# Patient Record
Sex: FEMALE | Race: WHITE
[De-identification: names, ages, dates, MRNs, and addresses within clinical notes are randomized per-mention and may not be internally consistent; named-entity substitution may affect disease eponyms.]

---

## 2018-12-09 ENCOUNTER — TRANSCRIPTION ENCOUNTER (OUTPATIENT)
Age: 38
End: 2018-12-09

## 2020-02-04 ENCOUNTER — OUTPATIENT (OUTPATIENT)
Dept: EMERGENCY DEPT | Facility: HOSPITAL | Age: 40
LOS: 1 days | Discharge: HOME | End: 2020-02-04
Payer: COMMERCIAL

## 2020-02-04 VITALS — DIASTOLIC BLOOD PRESSURE: 56 MMHG | HEART RATE: 108 BPM | SYSTOLIC BLOOD PRESSURE: 120 MMHG

## 2020-02-04 VITALS
DIASTOLIC BLOOD PRESSURE: 79 MMHG | OXYGEN SATURATION: 98 % | TEMPERATURE: 99 F | SYSTOLIC BLOOD PRESSURE: 133 MMHG | RESPIRATION RATE: 18 BRPM | HEART RATE: 129 BPM

## 2020-02-04 DIAGNOSIS — Z87.2 PERSONAL HISTORY OF DISEASES OF THE SKIN AND SUBCUTANEOUS TISSUE: Chronic | ICD-10-CM

## 2020-02-04 DIAGNOSIS — Z98.890 OTHER SPECIFIED POSTPROCEDURAL STATES: Chronic | ICD-10-CM

## 2020-02-04 LAB
ALBUMIN SERPL ELPH-MCNC: 3.6 G/DL — SIGNIFICANT CHANGE UP (ref 3.5–5.2)
ALP SERPL-CCNC: 103 U/L — SIGNIFICANT CHANGE UP (ref 30–115)
ALT FLD-CCNC: 18 U/L — SIGNIFICANT CHANGE UP (ref 0–41)
ANION GAP SERPL CALC-SCNC: 15 MMOL/L — HIGH (ref 7–14)
APPEARANCE UR: CLEAR — SIGNIFICANT CHANGE UP
AST SERPL-CCNC: 23 U/L — SIGNIFICANT CHANGE UP (ref 0–41)
B-OH-BUTYR SERPL-SCNC: 1.2 MMOL/L — HIGH
BACTERIA # UR AUTO: ABNORMAL
BASOPHILS # BLD AUTO: 0.05 K/UL — SIGNIFICANT CHANGE UP (ref 0–0.2)
BASOPHILS NFR BLD AUTO: 0.5 % — SIGNIFICANT CHANGE UP (ref 0–1)
BILIRUB SERPL-MCNC: 0.3 MG/DL — SIGNIFICANT CHANGE UP (ref 0.2–1.2)
BILIRUB UR-MCNC: NEGATIVE — SIGNIFICANT CHANGE UP
BUN SERPL-MCNC: 8 MG/DL — LOW (ref 10–20)
CALCIUM SERPL-MCNC: 9.2 MG/DL — SIGNIFICANT CHANGE UP (ref 8.5–10.1)
CHLORIDE SERPL-SCNC: 103 MMOL/L — SIGNIFICANT CHANGE UP (ref 98–110)
CO2 SERPL-SCNC: 18 MMOL/L — SIGNIFICANT CHANGE UP (ref 17–32)
COLOR SPEC: YELLOW — SIGNIFICANT CHANGE UP
CREAT SERPL-MCNC: 0.6 MG/DL — LOW (ref 0.7–1.5)
DIFF PNL FLD: NEGATIVE — SIGNIFICANT CHANGE UP
EOSINOPHIL # BLD AUTO: 0 K/UL — SIGNIFICANT CHANGE UP (ref 0–0.7)
EOSINOPHIL NFR BLD AUTO: 0 % — SIGNIFICANT CHANGE UP (ref 0–8)
EPI CELLS # UR: 3 /HPF — SIGNIFICANT CHANGE UP (ref 0–5)
GLUCOSE SERPL-MCNC: 83 MG/DL — SIGNIFICANT CHANGE UP (ref 70–99)
GLUCOSE UR QL: NEGATIVE — SIGNIFICANT CHANGE UP
HCG SERPL QL: POSITIVE — SIGNIFICANT CHANGE UP
HCT VFR BLD CALC: 35.7 % — LOW (ref 37–47)
HGB BLD-MCNC: 12.2 G/DL — SIGNIFICANT CHANGE UP (ref 12–16)
HYALINE CASTS # UR AUTO: 3 /LPF — SIGNIFICANT CHANGE UP (ref 0–7)
IMM GRANULOCYTES NFR BLD AUTO: 0.6 % — HIGH (ref 0.1–0.3)
KETONES UR-MCNC: ABNORMAL
LEUKOCYTE ESTERASE UR-ACNC: NEGATIVE — SIGNIFICANT CHANGE UP
LYMPHOCYTES # BLD AUTO: 0.6 K/UL — LOW (ref 1.2–3.4)
LYMPHOCYTES # BLD AUTO: 5.7 % — LOW (ref 20.5–51.1)
MAGNESIUM SERPL-MCNC: 1.8 MG/DL — SIGNIFICANT CHANGE UP (ref 1.8–2.4)
MCHC RBC-ENTMCNC: 31.6 PG — HIGH (ref 27–31)
MCHC RBC-ENTMCNC: 34.2 G/DL — SIGNIFICANT CHANGE UP (ref 32–37)
MCV RBC AUTO: 92.5 FL — SIGNIFICANT CHANGE UP (ref 81–99)
MONOCYTES # BLD AUTO: 0.32 K/UL — SIGNIFICANT CHANGE UP (ref 0.1–0.6)
MONOCYTES NFR BLD AUTO: 3 % — SIGNIFICANT CHANGE UP (ref 1.7–9.3)
NEUTROPHILS # BLD AUTO: 9.48 K/UL — HIGH (ref 1.4–6.5)
NEUTROPHILS NFR BLD AUTO: 90.2 % — HIGH (ref 42.2–75.2)
NITRITE UR-MCNC: NEGATIVE — SIGNIFICANT CHANGE UP
NRBC # BLD: 0 /100 WBCS — SIGNIFICANT CHANGE UP (ref 0–0)
PH UR: 6 — SIGNIFICANT CHANGE UP (ref 5–8)
PLATELET # BLD AUTO: 299 K/UL — SIGNIFICANT CHANGE UP (ref 130–400)
POTASSIUM SERPL-MCNC: 4.2 MMOL/L — SIGNIFICANT CHANGE UP (ref 3.5–5)
POTASSIUM SERPL-SCNC: 4.2 MMOL/L — SIGNIFICANT CHANGE UP (ref 3.5–5)
PROT SERPL-MCNC: 6.6 G/DL — SIGNIFICANT CHANGE UP (ref 6–8)
PROT UR-MCNC: ABNORMAL
RBC # BLD: 3.86 M/UL — LOW (ref 4.2–5.4)
RBC # FLD: 12.7 % — SIGNIFICANT CHANGE UP (ref 11.5–14.5)
RBC CASTS # UR COMP ASSIST: 0 /HPF — SIGNIFICANT CHANGE UP (ref 0–4)
SODIUM SERPL-SCNC: 136 MMOL/L — SIGNIFICANT CHANGE UP (ref 135–146)
SP GR SPEC: 1.03 — HIGH (ref 1.01–1.02)
UROBILINOGEN FLD QL: SIGNIFICANT CHANGE UP
WBC # BLD: 10.51 K/UL — SIGNIFICANT CHANGE UP (ref 4.8–10.8)
WBC # FLD AUTO: 10.51 K/UL — SIGNIFICANT CHANGE UP (ref 4.8–10.8)
WBC UR QL: 5 /HPF — SIGNIFICANT CHANGE UP (ref 0–5)

## 2020-02-04 PROCEDURE — 93010 ELECTROCARDIOGRAM REPORT: CPT

## 2020-02-04 PROCEDURE — 99285 EMERGENCY DEPT VISIT HI MDM: CPT

## 2020-02-04 RX ORDER — SODIUM CHLORIDE 9 MG/ML
2000 INJECTION INTRAMUSCULAR; INTRAVENOUS; SUBCUTANEOUS ONCE
Refills: 0 | Status: COMPLETED | OUTPATIENT
Start: 2020-02-04 | End: 2020-02-04

## 2020-02-04 RX ORDER — ACETAMINOPHEN 500 MG
650 TABLET ORAL ONCE
Refills: 0 | Status: COMPLETED | OUTPATIENT
Start: 2020-02-04 | End: 2020-02-04

## 2020-02-04 RX ORDER — ONDANSETRON 8 MG/1
4 TABLET, FILM COATED ORAL ONCE
Refills: 0 | Status: COMPLETED | OUTPATIENT
Start: 2020-02-04 | End: 2020-02-04

## 2020-02-04 RX ADMIN — Medication 650 MILLIGRAM(S): at 17:47

## 2020-02-04 RX ADMIN — SODIUM CHLORIDE 4000 MILLILITER(S): 9 INJECTION INTRAMUSCULAR; INTRAVENOUS; SUBCUTANEOUS at 17:47

## 2020-02-04 RX ADMIN — ONDANSETRON 4 MILLIGRAM(S): 8 TABLET, FILM COATED ORAL at 17:48

## 2020-02-04 NOTE — ED ADULT NURSE REASSESSMENT NOTE - NS ED NURSE REASSESS COMMENT FT1
patient assessed, patient alert and oriented x4. Patient complains of nausea, IV Zofran and Tylenol given with IV fluids. Labs sent pending results, will monitor.

## 2020-02-04 NOTE — ED PROVIDER NOTE - PHYSICAL EXAMINATION
Constitutional: Well appearing. No acute distress. Non toxic.   Eyes: PERRLA. Extraocular movements intact, no entrapment. Conjunctiva normal.   ENT: No nasal discharge. dry mucus membranes.  Neck: Supple, non tender, full range of motion.  CV: RRR no murmurs, rubs, or gallops. +S1S2.   Pulm: Clear to auscultation bilaterally. Normal work of breathing.  Abd: soft NT ND +BS.   No CVA tenderness b/l  Ext: Warm and well perfused x4, moving all extremities, no edema.   Psy: Cooperative, appropriate.   Skin: Warm, dry, no rash  Neuro: CN2-12 grossly intact no sensory or motor deficits throughout, no drift, no ataxia

## 2020-02-04 NOTE — OB PROVIDER TRIAGE NOTE - NSOBPROVIDERNOTE_OBGYN_ALL_OB_FT
40yo  at 30w5d, with nausea and vomiting, now resolved.   -f/u with OBGYN this week for routine prenatal care  -infection precautions given  - labor precautions given     Dr. Gould aware, Dr. Kristen adrian 40yo  at 30w5d, with nausea and vomiting, now resolved, no obstetric complaints, fetal and maternal status reassuring.  -f/u with OBGYN this week for routine prenatal care  -infection precautions given  - labor precautions given     Dr. Gould aware, Dr. Kristen adrian

## 2020-02-04 NOTE — OB PROVIDER TRIAGE NOTE - NSHPLABSRESULTS_GEN_ALL_CORE
02-04    136  |  103  |  8<L>  ----------------------------<  83  4.2   |  18  |  0.6<L>    Ca    9.2      04 Feb 2020 17:50  Mg     1.8     02-04    TPro  6.6  /  Alb  3.6  /  TBili  0.3  /  DBili  x   /  AST  23  /  ALT  18  /  AlkPhos  103  02-04                          12.2   10.51 )-----------( 299      ( 04 Feb 2020 17:50 )             35.7

## 2020-02-04 NOTE — ED PROVIDER NOTE - NS ED ROS FT
Constitutional:  See HPI.   Eyes:  No visual changes, eye pain or discharge.  ENMT:  No hearing changes, pain, discharge or infections. No neck pain or stiffness.  Cardiac:  No chest pain, SOB or edema. No chest pain with exertion.  Respiratory:  No cough or respiratory distress. No hemoptysis.  GI:  No  diarrhea   :  No dysuria, frequency, hematuria  MS:  No joint pain or back pain.  Neuro:  No LOC. No headache or weakness.    Skin:  No skin rash.  Except as in HPI, all other review of systems is negative

## 2020-02-04 NOTE — ED PROVIDER NOTE - OBJECTIVE STATEMENT
40yo F  30wks pregnant follows with Dr. Steward at [facility] presenting with fevers chills nausea/vomiting since 3am today. +abd cramping, no bleeding/LOF. +lower back pain. Sx sudden onset moderate no other exac/relieving sx.

## 2020-02-04 NOTE — ED ADULT NURSE NOTE - OBJECTIVE STATEMENT
Patient is a 39 year old female 31 weeks pregnant, presents to ED with complains of nausea, vomiting and fever. Denies any chest pain, shortness of breath, urinary symptoms or cough.

## 2020-02-04 NOTE — OB PROVIDER TRIAGE NOTE - NSHPPHYSICALEXAM_GEN_ALL_CORE
Vital Signs Last 24 Hrs  T(C): 37.1 (04 Feb 2020 15:23), Max: 37.1 (04 Feb 2020 15:23)  T(F): 98.7 (04 Feb 2020 15:23), Max: 98.7 (04 Feb 2020 15:23)  HR: 108 (04 Feb 2020 21:35) (108 - 129)  BP: 120/- (04 Feb 2020 21:35) (120/- - 133/79)  RR: 18 (04 Feb 2020 15:23) (18 - 18)  SpO2: 98% (04 Feb 2020 15:23) (98% - 98%)\    Gen: A+OX.3 NAD.  CV: S1+S2  Pulm: CTAB  Abd: Soft, nontender. No uterine tenderness. No CVA. Mild epigastric discomfort.   LE: No edema.   FHR: 130bpm/mod rafael/accels+  TOCO none  SVE deferred Vital Signs Last 24 Hrs  T(C): 37.1 (04 Feb 2020 15:23), Max: 37.1 (04 Feb 2020 15:23)  T(F): 98.7 (04 Feb 2020 15:23), Max: 98.7 (04 Feb 2020 15:23)  HR: 108 (04 Feb 2020 21:35) (108 - 129)  BP: 120/- (04 Feb 2020 21:35) (120/- - 133/79)  RR: 18 (04 Feb 2020 15:23) (18 - 18)  SpO2: 98% (04 Feb 2020 15:23) (98% - 98%)\    Gen: A+OX.3 NAD.  CV: S1+S2  Pulm: CTAB  Abd: Soft, nontender. No uterine tenderness. No CVA. Mild epigastric discomfort.   LE: No edema.   FHR: 130bpm/mod rafael/accels+  TOCO none  SVE deferred  BSS: BPP 8/8, vertex, anterior placenta, MVP 4cm Vital Signs Last 24 Hrs  T(C): 37.1 (04 Feb 2020 15:23), Max: 37.1 (04 Feb 2020 15:23)  T(F): 98.7 (04 Feb 2020 15:23), Max: 98.7 (04 Feb 2020 15:23)  HR: 108 (04 Feb 2020 21:35) (108 - 129)  BP: 120/- (04 Feb 2020 21:35) (120/- - 133/79)  RR: 18 (04 Feb 2020 15:23) (18 - 18)  SpO2: 98% (04 Feb 2020 15:23) (98% - 98%)    Gen: A+OX.3 NAD.  CV: S1+S2  Pulm: CTAB  Abd: Soft, nontender, no palpable contractions. No uterine tenderness. No CVA. Mild epigastric discomfort.   LE: No edema.   FHR: 130bpm/mod rafael/accels+  TOCO none  SVE deferred  BSS: BPP 8/8, vertex, anterior placenta, MVP 4cm

## 2020-02-04 NOTE — OB PROVIDER TRIAGE NOTE - NS_OBGYNHISTORY_OBGYN_ALL_OB_FT
OB: D+CX3 for SAB.   Gyn: Denies history of abnormal papsmears, STIs, uterine fibroids or ovarian cysts

## 2020-02-04 NOTE — OB PROVIDER TRIAGE NOTE - HISTORY OF PRESENT ILLNESS
40yo  at 30w5d, dated by first trimester sonogram per patient, with nausea and vomiting since 0300. She reports nausea and vomiting, now resolved. She reports Last vomiting around 1900. She reports epigastric discomfort this afternoon, and currently declines any abdominal pain, cramping, pelvic pain, urinary pain, pain with urinations. Good Fetal movement. Denies complications this pregnancy. Follows at [facility] with Dr. Steward. Was given ZofranX1 in the ED, reports feeling better since. Denies CP, SOB, LE pain/ swelling, diarrhea, pain/difficulty with urination, fevers, chills. Last sexual intercourse >1 week ago, last bowel movement yesterday, last PO intake yesterday. 38yo  at 30w5d, dated by first trimester sonogram per patient, with nausea and vomiting since 0300. She reports nausea and vomiting, now resolved. She reports Last vomiting around 1900. She reports epigastric discomfort this afternoon, and currently declines any abdominal pain, cramping, pelvic pain, urinary pain, pain with urinations. Good Fetal movement, denies contractions, VB or LOF. Denies complications this pregnancy. Follows at [facility] with Dr. Steward. Was given ZofranX1 in the ED, reports feeling better since. Denies CP, SOB, LE pain/ swelling, diarrhea, pain/difficulty with urination, fevers, chills. Last sexual intercourse >1 week ago, last bowel movement yesterday, last PO intake yesterday.

## 2020-02-04 NOTE — ED PROVIDER NOTE - CLINICAL SUMMARY MEDICAL DECISION MAKING FREE TEXT BOX
40yo F  30wks pregnant follows with Dr. Steward at [facility] presenting with fevers chills nausea/vomiting since 3am today. +abd cramping, no bleeding/LOF. +lower back pain. Sx sudden onset moderate no other exac/relieving sx. labs ekg reviewed. FHR within normal limits. Aware of all results, given a copy of all available results, comfortable with discharge and follow-up outpatient, strict return precautions given. Endorses understanding of all of this and aware that they can return at any time for new or concerning symptoms. No further questions or concerns at this time 40yo F  30wks pregnant follows with Dr. Steward at [facility] presenting with fevers chills nausea/vomiting since 3am today. +abd cramping, no bleeding/LOF. +lower back pain. Sx sudden onset moderate no other exac/relieving sx. labs ekg reviewed. FHR within normal limits. Aware of all results, given a copy of all available results. [facility], will AOU

## 2020-02-06 LAB
CULTURE RESULTS: SIGNIFICANT CHANGE UP
SPECIMEN SOURCE: SIGNIFICANT CHANGE UP

## 2020-02-10 DIAGNOSIS — O21.9 VOMITING OF PREGNANCY, UNSPECIFIED: ICD-10-CM

## 2020-02-10 DIAGNOSIS — Z3A.30 30 WEEKS GESTATION OF PREGNANCY: ICD-10-CM

## 2020-02-10 DIAGNOSIS — R10.9 UNSPECIFIED ABDOMINAL PAIN: ICD-10-CM

## 2020-02-10 DIAGNOSIS — M54.5 LOW BACK PAIN: ICD-10-CM

## 2020-02-10 DIAGNOSIS — R50.9 FEVER, UNSPECIFIED: ICD-10-CM

## 2021-07-25 ENCOUNTER — OUTPATIENT (OUTPATIENT)
Dept: OUTPATIENT SERVICES | Facility: HOSPITAL | Age: 41
LOS: 1 days | Discharge: HOME | End: 2021-07-25

## 2021-07-25 DIAGNOSIS — Z87.2 PERSONAL HISTORY OF DISEASES OF THE SKIN AND SUBCUTANEOUS TISSUE: Chronic | ICD-10-CM

## 2021-07-25 DIAGNOSIS — Z98.890 OTHER SPECIFIED POSTPROCEDURAL STATES: Chronic | ICD-10-CM

## 2021-07-25 DIAGNOSIS — Z11.59 ENCOUNTER FOR SCREENING FOR OTHER VIRAL DISEASES: ICD-10-CM

## 2021-07-25 PROBLEM — Z78.9 OTHER SPECIFIED HEALTH STATUS: Chronic | Status: ACTIVE | Noted: 2020-02-04

## 2022-06-20 ENCOUNTER — NON-APPOINTMENT (OUTPATIENT)
Age: 42
End: 2022-06-20

## 2023-01-25 ENCOUNTER — APPOINTMENT (OUTPATIENT)
Dept: OBGYN | Facility: CLINIC | Age: 43
End: 2023-01-25

## 2023-09-25 ENCOUNTER — NON-APPOINTMENT (OUTPATIENT)
Age: 43
End: 2023-09-25

## 2024-05-20 ENCOUNTER — NON-APPOINTMENT (OUTPATIENT)
Age: 44
End: 2024-05-20

## 2024-07-20 ENCOUNTER — OUTPATIENT (OUTPATIENT)
Dept: OUTPATIENT SERVICES | Facility: HOSPITAL | Age: 44
LOS: 1 days | End: 2024-07-20
Payer: COMMERCIAL

## 2024-07-20 DIAGNOSIS — Z12.31 ENCOUNTER FOR SCREENING MAMMOGRAM FOR MALIGNANT NEOPLASM OF BREAST: ICD-10-CM

## 2024-07-20 DIAGNOSIS — R92.2 INCONCLUSIVE MAMMOGRAM: ICD-10-CM

## 2024-07-20 DIAGNOSIS — Z87.2 PERSONAL HISTORY OF DISEASES OF THE SKIN AND SUBCUTANEOUS TISSUE: Chronic | ICD-10-CM

## 2024-07-20 DIAGNOSIS — Z98.890 OTHER SPECIFIED POSTPROCEDURAL STATES: Chronic | ICD-10-CM

## 2024-07-20 PROCEDURE — 77067 SCR MAMMO BI INCL CAD: CPT

## 2024-07-20 PROCEDURE — 77063 BREAST TOMOSYNTHESIS BI: CPT

## 2024-07-20 PROCEDURE — 77063 BREAST TOMOSYNTHESIS BI: CPT | Mod: 26

## 2024-07-20 PROCEDURE — 76641 ULTRASOUND BREAST COMPLETE: CPT | Mod: 26,50

## 2024-07-20 PROCEDURE — 77067 SCR MAMMO BI INCL CAD: CPT | Mod: 26

## 2024-07-20 PROCEDURE — 76641 ULTRASOUND BREAST COMPLETE: CPT | Mod: 50

## 2024-07-21 DIAGNOSIS — R92.2 INCONCLUSIVE MAMMOGRAM: ICD-10-CM

## 2024-07-21 DIAGNOSIS — Z12.31 ENCOUNTER FOR SCREENING MAMMOGRAM FOR MALIGNANT NEOPLASM OF BREAST: ICD-10-CM

## 2024-09-23 ENCOUNTER — NON-APPOINTMENT (OUTPATIENT)
Age: 44
End: 2024-09-23

## 2024-12-05 ENCOUNTER — NON-APPOINTMENT (OUTPATIENT)
Age: 44
End: 2024-12-05

## 2025-02-01 ENCOUNTER — NON-APPOINTMENT (OUTPATIENT)
Age: 45
End: 2025-02-01